# Patient Record
(demographics unavailable — no encounter records)

---

## 2025-01-14 NOTE — ASSESSMENT
[FreeTextEntry1] : Mr. YIMI SPENCE, 68 year old male, legally blind, never smoker, w/ hx of HTN, BPH, prostate cancer s/p prostatectomy and cyber knife in 05/2023. Now with RUL nodule, referred by Dr. Mao Schulz (heme/onc) for CT Sx evaluation.  PET/CT on 10/11/24 ([facility]): - Right upper lobe consolidative opacity without or with low-grade activity (series 2, image 109) measuring 2.9 x 1.6 cm showing SUV max 2.2. - A mildly enlarged left para-aortic node without significant radiotracer activity possibly benign in etiology.  CT Chest w/IV Contrast on 11/08/24 ([facility]): - 2.6 x 1.2 cm nodular opacity right upper lobe, similar in appearance to recent PET-CT 10/11/2024, where it measured 2.7 x 1.5 cm at a similar level. - Adjacent nonspecific linear & nodular opacities.  - Atherosclerosis; 0.3-0.4 cm right middle lobe prevascular nodule medially (series 2, image 128).  - A mildly enlarged left para-aortic node without significant radiotracer activity possibly benign in etiology. - Subcentimeter mediastinal and hilar lymph nodes.  - Subcentimeter axillary lymph nodes.  I have reviewed the patient's medical records and diagnostic images at time of this office consultation and have made the following recommendation: 1. CT reviewed with pt, RUL nodule looks suspicious, will refer pt to IP Dr. Stern for navigational bronchoscopy bx of this nodule.  2. CT Chest with navigational bronchoscopy protocol now  3. PFTs for surgical planning 4. RTC after all above to discuss next step.    I, Dr. KUNZ, JEREMY RODRIGUEZ, personally performed the evaluation and management (E/M) services for this established patient who presents today with (a) new problem(s)/exacerbation of (an) existing condition(s).  That E/M includes conducting the examination, assessing all new/exacerbated conditions, and establishing a new plan of care.  Today, my ACP, Rhoda Adame, [facility] was here to observe my evaluation and management services for this new problem/exacerbated condition to be followed going forward.1

## 2025-01-14 NOTE — HISTORY OF PRESENT ILLNESS
[FreeTextEntry1] : Mr. YIMI SPENCE, 68 year old male, legally blind, never smoker, w/ hx of HTN, BPH, prostate cancer s/p prostatectomy and cyber knife in 05/2023. Now with RUL nodule, referred by Dr. Mao Schulz (heme/onc) for CT Sx evaluation.  PET/CT on 10/11/24 ([facility]): - Right upper lobe consolidative opacity without or with low-grade activity (series 2, image 109) measuring 2.9 x 1.6 cm showing SUV max 2.2. - A mildly enlarged left para-aortic node without significant radiotracer activity possibly benign in etiology.  CT Chest w/IV Contrast on 11/08/24 ([facility]): - 2.6 x 1.2 cm nodular opacity right upper lobe, similar in appearance to recent PET-CT 10/11/2024, where it measured 2.7 x 1.5 cm at a similar level. - Adjacent nonspecific linear & nodular opacities.  - Atherosclerosis; 0.3-0.4 cm right middle lobe prevascular nodule medially (series 2, image 128).  - A mildly enlarged left para-aortic node without significant radiotracer activity possibly benign in etiology. - Subcentimeter mediastinal and hilar lymph nodes.  - Subcentimeter axillary lymph nodes.  Patient is here today for CT Sx consultation.

## 2025-01-14 NOTE — CONSULT LETTER
[Dear  ___] : Dear  [unfilled], [Consult Letter:] : I had the pleasure of evaluating your patient, [unfilled]. [( Thank you for referring [unfilled] for consultation for _____ )] : Thank you for referring [unfilled] for consultation for [unfilled] [Please see my note below.] : Please see my note below. [Consult Closing:] : Thank you very much for allowing me to participate in the care of this patient.  If you have any questions, please do not hesitate to contact me. [Sincerely,] : Sincerely, [FreeTextEntry2] : Dr. Mao Schulz (heme/onc/ref)  [FreeTextEntry3] : Rachelle Vickers MD Attending Surgeon Division of Thoracic Surgery  [employer] at [facility] [place] [phone] [place] [address] Tel: [phone] Fax: [phone]

## 2025-01-14 NOTE — DATA REVIEWED
[FreeTextEntry1] : I have independently reviewed the following: PET/CT on 10/11/24 CT Chest w/IV Contrast on 11/08/24

## 2025-01-14 NOTE — ASSESSMENT
[FreeTextEntry1] : Mr. YIMI SPENCE, 68 year old male, legally blind, never smoker, w/ hx of HTN, BPH, prostate cancer s/p prostatectomy and cyber knife in 05/2023. Now with RUL nodule, referred by Dr. Mao Schulz (heme/onc) for CT Sx evaluation.  PET/CT on 10/11/24 ([facility]): - Right upper lobe consolidative opacity without or with low-grade activity (series 2, image 109) measuring 2.9 x 1.6 cm showing SUV max 2.2. - A mildly enlarged left para-aortic node without significant radiotracer activity possibly benign in etiology.  CT Chest w/IV Contrast on 11/08/24 ([facility]): - 2.6 x 1.2 cm nodular opacity right upper lobe, similar in appearance to recent PET-CT 10/11/2024, where it measured 2.7 x 1.5 cm at a similar level. - Adjacent nonspecific linear & nodular opacities.  - Atherosclerosis; 0.3-0.4 cm right middle lobe prevascular nodule medially (series 2, image 128).  - A mildly enlarged left para-aortic node without significant radiotracer activity possibly benign in etiology. - Subcentimeter mediastinal and hilar lymph nodes.  - Subcentimeter axillary lymph nodes.  I have reviewed the patient's medical records and diagnostic images at time of this office consultation and have made the following recommendation: 1. CT reviewed with pt, RUL nodule looks suspicious, will refer pt to IP Dr. Stern for navigational bronchoscopy bx of this nodule.  2. CT Chest with navigational bronchoscopy protocol now  3. PFTs for surgical planning 4. RTC after all above to discuss next step.    I, Dr. KUNZ, JEREMY RODRIGUEZ, personally performed the evaluation and management (E/M) services for this established patient who presents today with (a) new problem(s)/exacerbation of (an) existing condition(s).  That E/M includes conducting the examination, assessing all new/exacerbated conditions, and establishing a new plan of care.  Today, my ACP, Rhoda Adame, [facility] was here to observe my evaluation and management services for this new problem/exacerbated condition to be followed going forward.1

## 2025-01-14 NOTE — HISTORY OF PRESENT ILLNESS
[FreeTextEntry1] : Mr. YIMI SPENCE, 68 year old male, legally blind, never smoker, w/ hx of HTN, BPH, prostate cancer s/p prostatectomy and cyber knife in 05/2023. Now with RUL nodule, referred by Dr. Mao Schulz (heme/onc) for CT Sx evaluation.  PET/CT on 10/11/24 ([facility]): - Right upper lobe consolidative opacity without or with low-grade activity (series 2, image 109) measuring 2.9 x 1.6 cm showing SUV max 2.2. - A mildly enlarged left para-aortic node without significant radiotracer activity possibly benign in etiology.  CT Chest w/IV Contrast on 11/08/24 ([facility]): - 2.6 x 1.2 cm nodular opacity right upper lobe, similar in appearance to recent PET-CT 10/11/2024, where it measured 2.7 x 1.5 cm at a similar level. - Adjacent nonspecific linear & nodular opacities.  - Atherosclerosis; 0.3-0.4 cm right middle lobe prevascular nodule medially (series 2, image 128).  - A mildly enlarged left para-aortic node without significant radiotracer activity possibly benign in etiology. - Subcentimeter mediastinal and hilar lymph nodes.  - Subcentimeter axillary lymph nodes.  Patient is here today for CT Sx consultation.

## 2025-01-14 NOTE — CONSULT LETTER
[Dear  ___] : Dear  [unfilled], [Consult Letter:] : I had the pleasure of evaluating your patient, [unfilled]. [( Thank you for referring [unfilled] for consultation for _____ )] : Thank you for referring [unfilled] for consultation for [unfilled] [Please see my note below.] : Please see my note below. [Consult Closing:] : Thank you very much for allowing me to participate in the care of this patient.  If you have any questions, please do not hesitate to contact me. [Sincerely,] : Sincerely, [FreeTextEntry2] : Dr. Mao Schulz (heme/onc/ref)  [FreeTextEntry3] : Rachelle Vickers MD Attending Surgeon Division of Thoracic Surgery  [employer] at [facility] [phone] [place] [address] Tel: [phone] Fax: [phone]

## 2025-02-06 NOTE — HISTORY OF PRESENT ILLNESS
[Never] : never [TextBox_4] : Interventional Pulmonology Consultation Note First Visit with IP: Feb 6 2025 9:30AM   Mr. SPENCE is a 68-year-old with PMH of HTN, BPH, prostate cancer (prostatectomy and cyber knife in 05/2023), legally blind. Patient was referred to Interventional Pulmonology by Dr. Vickers for bronchoscopy for RUL nodule.    Patient most recent CT scan of the Chest done on 01/28/2025 at  was notable for the following: -Teardrop shaped posterior right upper lobe nodular opacity measuring 11 x 27 mm (previously 2.6 x 1.2 cm 11/2024 which was done with IV Contrast and not consistent with AV malformation/vascular structure ).     PET/CT on 10/11/24 ([facility]): - Right upper lobe consolidative opacity without or with low-grade activity (series 2, image 109) measuring 2.9 x 1.6 cm showing SUV max 2.2. - A mildly enlarged left para-aortic node without significant radiotracer activity possibly benign in etiology.  CT Chest w/IV Contrast on 11/08/24 ([facility]): - 2.6 x 1.2 cm nodular opacity right upper lobe, similar in appearance to recent PET-CT 10/11/2024, where it measured 2.7 x 1.5 cm at a similar level. - Adjacent nonspecific linear & nodular opacities. - Atherosclerosis; 0.3-0.4 cm right middle lobe prevascular nodule medially (series 2, image 128). - A mildly enlarged left para-aortic node without significant radiotracer activity possibly benign in etiology. - Subcentimeter mediastinal and hilar lymph nodes. - Subcentimeter axillary lymph nodes.   Mr. SPENCE past medical history is notable for:    Today He is [ ]

## 2025-02-06 NOTE — DISCUSSION/SUMMARY
[FreeTextEntry1] : PFT done 01/29/2025 was notable for the following:  FVC: 103% FEV1:117% PEF: 131 % RV: --% TLC: -- % DLCO: -- %

## 2025-02-06 NOTE — ASSESSMENT
[FreeTextEntry1] : Mr. SPENCE is a 68-year-old with PMH of HTN, BPH, prostate cancer (prostatectomy and cyber knife in 05/2023), legally blind. Patient was referred to Interventional Pulmonology due to RUL nodule  11 x 27 mm.  CT changes look to be a nodule however radiology raised the concern that there could be a vascular component raising concern for AV malformation.  Previous documentation suggest that not the case I will ask for CD and PET scan to be sent to me for review prior to but booking procedure  After further review of the provided diagnostic the best procedural option at this time is [facility] Navigation Bronchoscopy. - Orders were implemented IP Booking and CBC, CMP, INR and APTT - We also discuss getting medical clearance from PCP.  Risks of the procedure including but not limited to pneumothorax, bleeding, and hypoxemia were discussed. Potential interventions for these complications were also described extensively. Alternative modalities of diagnostics and therapeutics including surveillance imaging were also discussed. After a thorough risk benefit discussion where all related questions were satisfactorily answered the patient agreed to proceed with the mutually planned intervention.  The patient was advised the importance of follow up and further work up as malignancy is in the differential for this lung nodule. The patient was advised that in the absence of diagnosis and without surveillance imaging and adequate follow up as recommended by current guidelines, there is a risk of progression of malignancy. The importance of surveillance imaging as per the current Fleischner Society/Lung Rads guidelines was discussed. The patient demonstrated understanding of the same with verbal feedback.

## 2025-02-06 NOTE — HISTORY OF PRESENT ILLNESS
[Never] : never [TextBox_4] : Interventional Pulmonology Consultation Note First Visit with IP: Feb 6 2025 9:30AM   Mr. SPENCE is a 68-year-old with PMH of HTN, BPH, prostate cancer (prostatectomy and cyber knife in 05/2023), legally blind. Patient was referred to Interventional Pulmonology by Dr. Vickers for bronchoscopy for RUL nodule.    Patient most recent CT scan of the Chest done on 01/28/2025 at  was notable for the following: -Teardrop shaped posterior right upper lobe nodular opacity measuring 11 x 27 mm (previously 2.6 x 1.2 cm 11/2024 which was done with IV Contrast and not consistent with AV malformation/vascular structure ).     PET/CT on 10/11/24 ([facility]): - Right upper lobe consolidative opacity without or with low-grade activity (series 2, image 109) measuring 2.9 x 1.6 cm showing SUV max 2.2. - A mildly enlarged left para-aortic node without significant radiotracer activity possibly benign in etiology.  CT Chest w/IV Contrast on 11/08/24 ([facility]): - 2.6 x 1.2 cm nodular opacity right upper lobe, similar in appearance to recent PET-CT 10/11/2024, where it measured 2.7 x 1.5 cm at a similar level. - Adjacent nonspecific linear & nodular opacities. - Atherosclerosis; 0.3-0.4 cm right middle lobe prevascular nodule medially (series 2, image 128). - A mildly enlarged left para-aortic node without significant radiotracer activity possibly benign in etiology. - Subcentimeter mediastinal and hilar lymph nodes. - Subcentimeter axillary lymph nodes.   Mr. SPENCE past medical history is notable for:    Today He is [ ]

## 2025-03-10 NOTE — HISTORY OF PRESENT ILLNESS
[FreeTextEntry1] : Mr. YIMI SPENCE, 68 year old male, legally blind, never smoker, w/ hx of HTN, BPH, prostate cancer s/p prostatectomy and cyber knife in 05/2023. Now with RUL nodule, referred by Dr. Mao Schulz (heme/onc) for CT Sx evaluation.  PET/CT on 10/11/24 ([facility]): - Right upper lobe consolidative opacity without or with low-grade activity (series 2, image 109) measuring 2.9 x 1.6 cm showing SUV max 2.2. - A mildly enlarged left para-aortic node without significant radiotracer activity possibly benign in etiology.  CT Chest w/IV Contrast on 11/08/24 ([facility]): - 2.6 x 1.2 cm nodular opacity right upper lobe, similar in appearance to recent PET-CT 10/11/2024, where it measured 2.7 x 1.5 cm at a similar level. - Adjacent nonspecific linear & nodular opacities.  - Atherosclerosis; 0.3-0.4 cm right middle lobe prevascular nodule medially (series 2, image 128).  - A mildly enlarged left para-aortic node without significant radiotracer activity possibly benign in etiology. - Subcentimeter mediastinal and hilar lymph nodes.  - Subcentimeter axillary lymph nodes.  1/14/2025: CT reviewed with pt, RUL nodule looks suspicious, will refer pt to IP Dr. Stern for navigational bronchoscopy bx of this nodule. CT Chest with navigational bronchoscopy protocol now. PFTs for surgical planning. RTC after all above to discuss next step.  PFTs on 1/29/2025: FVC 3.55, 103%; FEV1 3.14, 117%; unable to complete phethysmography or diffusing capacity d/t difficulty with both inspiratory and expiratory maneuvers.   CT Chest non contrast on 1/28/2025: - Teardrop shaped posterior right upper lobe nodular opacity measuring 11 x 27 mm, possibly communicating with vessels medially and posteriorly. - Perifissural micronodule in the left midlung (5:140)  now s/p Navigational Bronch, EBUS, RUL biopsy on 2/28/2025 with Dr. Stern: - Path LYMPH NODE, 11RS, EBUS-GUIDED FNA: non diagnostic. The cytology slides and cell block section are composed of rare bronchial cells and an insufficient population of lymphocytes, blood and rare bronchial cells. - Path LUNG, RIGHT UPPER LOBE, BRONCHOALVEOLAR LAVAGE: negative for malignant cells. The cytology slide and cell block section are composed of bronchial cells, blood and macrophages.  CT Chest on 3/10/2025: -  Patient presents today for follow up.

## 2025-03-10 NOTE — CONSULT LETTER
[Dear  ___] : Dear  [unfilled], [Consult Letter:] : I had the pleasure of evaluating your patient, [unfilled]. [( Thank you for referring [unfilled] for consultation for _____ )] : Thank you for referring [unfilled] for consultation for [unfilled] [Please see my note below.] : Please see my note below. [Consult Closing:] : Thank you very much for allowing me to participate in the care of this patient.  If you have any questions, please do not hesitate to contact me. [Sincerely,] : Sincerely, [FreeTextEntry2] : Dr. Mao Schulz (heme/onc/ref)  [FreeTextEntry3] : Rachelle Vickers MD Attending Surgeon Division of Thoracic Surgery  [employer] at [facility] [phone] [place] [address] Tel: [phone] Fax: [phone]

## 2025-03-10 NOTE — DATA REVIEWED
[FreeTextEntry1] : I have independently reviewed the following: CT Chest on 1/28/2025 Pathology from 2/28/2025 CT Chest on 3/10/2025

## 2025-03-18 NOTE — HISTORY OF PRESENT ILLNESS
[FreeTextEntry1] : Mr. YIMI SPENCE, 68 year old male, legally blind, never smoker, w/ hx of HTN, BPH, prostate cancer s/p prostatectomy and cyber knife in 05/2023. Now with RUL nodule, referred by Dr. Mao Schulz (heme/onc) for CT Sx evaluation.  PET/CT on 10/11/24 ([facility]): - Right upper lobe consolidative opacity without or with low-grade activity (series 2, image 109) measuring 2.9 x 1.6 cm showing SUV max 2.2. - A mildly enlarged left para-aortic node without significant radiotracer activity possibly benign in etiology.  CT Chest w/IV Contrast on 11/08/24 ([facility]): - 2.6 x 1.2 cm nodular opacity right upper lobe, similar in appearance to recent PET-CT 10/11/2024, where it measured 2.7 x 1.5 cm at a similar level. - Adjacent nonspecific linear & nodular opacities.  - Atherosclerosis; 0.3-0.4 cm right middle lobe prevascular nodule medially (series 2, image 128).  - A mildly enlarged left para-aortic node without significant radiotracer activity possibly benign in etiology. - Subcentimeter mediastinal and hilar lymph nodes.  - Subcentimeter axillary lymph nodes.  1/14/2025: CT reviewed with pt, RUL nodule looks suspicious, will refer pt to IP Dr. Stern for navigational bronchoscopy bx of this nodule. CT Chest with navigational bronchoscopy protocol now. PFTs for surgical planning. RTC after all above to discuss next step.  PFTs on 1/29/2025: FVC 3.55, 103%; FEV1 3.14, 117%; unable to complete phethysmography or diffusing capacity d/t difficulty with both inspiratory and expiratory maneuvers.   CT Chest non contrast on 1/28/2025: - Teardrop shaped posterior right upper lobe nodular opacity measuring 11 x 27 mm, possibly communicating with vessels medially and posteriorly. - Perifissural micronodule in the left midlung (5:140)  Now s/p Navigational Bronch, EBUS, RUL biopsy on 2/28/2025 with Dr. Stern: - Path LYMPH NODE, 11RS, EBUS-GUIDED FNA: non diagnostic. The cytology slides and cell block section are composed of rare bronchial cells and an insufficient population of lymphocytes, blood and rare bronchial cells. - Path LUNG, RIGHT UPPER LOBE, BRONCHOALVEOLAR LAVAGE: negative for malignant cells. The cytology slide and cell block section are composed of bronchial cells, blood and macrophages.  CT Chest on 3/10/2025: - stable right upper lobe posterior segment nonenhancing tubular and branching lesion surrounded by parenchymal hyperlucency (5, 48) 2.9 x 1.5 cm  Patient presents today for follow up. Pt reports doing well, denies SOB, cough or CP.

## 2025-03-18 NOTE — ASSESSMENT
[FreeTextEntry1] : Mr. YIMI SPENCE, 68 year old male, legally blind, never smoker, w/ hx of HTN, BPH, prostate cancer s/p prostatectomy and cyber knife in 05/2023. Now with RUL nodule, referred by Dr. Mao Schulz (heme/onc) for CT Sx evaluation.  Now s/p Navigational Bronch, EBUS, RUL biopsy on 2/28/2025 with Dr. Stern: - Path LYMPH NODE, 11RS, EBUS-GUIDED FNA: non diagnostic. The cytology slides and cell block section are composed of rare bronchial cells and an insufficient population of lymphocytes, blood and rare bronchial cells. - Path LUNG, RIGHT UPPER LOBE, BRONCHOALVEOLAR LAVAGE: negative for malignant cells. The cytology slide and cell block section are composed of bronchial cells, blood and macrophages.  CT Chest on 3/10/2025: - stable right upper lobe posterior segment nonenhancing tubular and branching lesion surrounded by parenchymal hyperlucency (5, 48) 2.9 x 1.5 cm  I have reviewed the patient's medical records and diagnostic images at time of this office consultation and have made the following recommendation: 1. 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 Path also revealed benign findings. Therefore, RTC in 6 mons with CT Chest w/o contrast   [facility] personally performed the evaluation and management (E/M) services for this established patient who presents today with (a) new problem(s)/exacerbation of (an) existing condition(s).  That E/M includes conducting the examination, assessing all new/exacerbated conditions, and establishing a new plan of care.  Today, my ACP, Rhoda Adame, [facility] was here to observe my evaluation and management services for this new problem/exacerbated condition to be followed going forward.

## 2025-03-18 NOTE — HISTORY OF PRESENT ILLNESS
[FreeTextEntry1] : Mr. YIMI SPENCE, 68 year old male, legally blind, never smoker, w/ hx of HTN, BPH, prostate cancer s/p prostatectomy and cyber knife in 05/2023. Now with RUL nodule, referred by Dr. Mao Schulz (heme/onc) for CT Sx evaluation.  PET/CT on 10/11/24 ([facility]): - Right upper lobe consolidative opacity without or with low-grade activity (series 2, image 109) measuring 2.9 x 1.6 cm showing SUV max 2.2. - A mildly enlarged left para-aortic node without significant radiotracer activity possibly benign in etiology.  CT Chest w/IV Contrast on 11/08/24 ([facility]): - 2.6 x 1.2 cm nodular opacity right upper lobe, similar in appearance to recent PET-CT 10/11/2024, where it measured 2.7 x 1.5 cm at a similar level. - Adjacent nonspecific linear & nodular opacities.  - Atherosclerosis; 0.3-0.4 cm right middle lobe prevascular nodule medially (series 2, image 128).  - A mildly enlarged left para-aortic node without significant radiotracer activity possibly benign in etiology. - Subcentimeter mediastinal and hilar lymph nodes.  - Subcentimeter axillary lymph nodes.  1/14/2025: CT reviewed with pt, RUL nodule looks suspicious, will refer pt to IP Dr. Stern for navigational bronchoscopy bx of this nodule. CT Chest with navigational bronchoscopy protocol now. PFTs for surgical planning. RTC after all above to discuss next step.  PFTs on 1/29/2025: FVC 3.55, 103%; FEV1 3.14, 117%; unable to complete phethysmography or diffusing capacity d/t difficulty with both inspiratory and expiratory maneuvers.   CT Chest non contrast on 1/28/2025: - Teardrop shaped posterior right upper lobe nodular opacity measuring 11 x 27 mm, possibly communicating with vessels medially and posteriorly. - Perifissural micronodule in the left midlung (5:140)  Now s/p Navigational Bronch, EBUS, RUL biopsy on 2/28/2025 with Dr. Stern: - Path LYMPH NODE, 11RS, EBUS-GUIDED FNA: non diagnostic. The cytology slides and cell block section are composed of rare bronchial cells and an insufficient population of lymphocytes, blood and rare bronchial cells. - Path LUNG, RIGHT UPPER LOBE, BRONCHOALVEOLAR LAVAGE: negative for malignant cells. The cytology slide and cell block section are composed of bronchial cells, blood and macrophages.  CT Chest on 3/10/2025: - stable right upper lobe posterior segment nonenhancing tubular and branching lesion surrounded by parenchymal hyperlucency (5, 48) 2.9 x 1.5 cm  Patient presents today for follow up. Pt reports doing well, denies SOB, cough or CP.

## 2025-03-18 NOTE — CONSULT LETTER
[FreeTextEntry2] : Dr. Mao Schulz (heme/onc/ref)  [FreeTextEntry3] : Rachelle Vickers MD Attending Surgeon Division of Thoracic Surgery  [employer] at [facility] [phone] [place] [address] Tel: [phone] Fax: [phone]

## 2025-03-18 NOTE — CONSULT LETTER
[FreeTextEntry2] : Dr. Mao Schulz (heme/onc/ref)  [FreeTextEntry3] : Rachelle Vickers MD Attending Surgeon Division of Thoracic Surgery  [employer] at [facility] [phone] [place] [address] Tel: [phone] Fax: [phone]

## 2025-03-18 NOTE — ASSESSMENT
[FreeTextEntry1] : Mr. YIMI SPENCE, 68 year old male, legally blind, never smoker, w/ hx of HTN, BPH, prostate cancer s/p prostatectomy and cyber knife in 05/2023. Now with RUL nodule, referred by Dr. Mao Schulz (heme/onc) for CT Sx evaluation.  Now s/p Navigational Bronch, EBUS, RUL biopsy on 2/28/2025 with Dr. Stern: - Path LYMPH NODE, 11RS, EBUS-GUIDED FNA: non diagnostic. The cytology slides and cell block section are composed of rare bronchial cells and an insufficient population of lymphocytes, blood and rare bronchial cells. - Path LUNG, RIGHT UPPER LOBE, BRONCHOALVEOLAR LAVAGE: negative for malignant cells. The cytology slide and cell block section are composed of bronchial cells, blood and macrophages.  CT Chest on 3/10/2025: - stable right upper lobe posterior segment nonenhancing tubular and branching lesion surrounded by parenchymal hyperlucency (5, 48) 2.9 x 1.5 cm  I have reviewed the patient's medical records and diagnostic images at time of this office consultation and have made the following recommendation: 1. 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 Path also revealed benign findings. Therefore, RTC in 6 mons with CT Chest w/o contrast   [facility]UNZ[facility] performed the evaluation and management (E/M) services for this established patient who presents today with (a) new problem(s)/exacerbation of (an) existing condition(s).  That E/M includes conducting the examination, assessing all new/exacerbated conditions, and establishing a new plan of care.  Today, my ACP, Rhoda Adame, PRATIK was here to observe my evaluation and management services for this new problem/exacerbated condition to be followed going forward.

## 2025-03-23 NOTE — HISTORY OF PRESENT ILLNESS
[TextBox_4] : Interventional Pulmonology Consultation Note First Visit with IP: Feb 6 2025 9:30AM   Mr. SPENCE is a 68-year-old with PMH of HTN, BPH, prostate cancer (prostatectomy and cyber knife in 05/2023), legally blind. Patient was referred to Interventional Pulmonology by Dr. Vickers for bronchoscopy for RUL nodule.    Patient most recent CT scan of the Chest done on 01/28/2025 at  was notable for the following: -Teardrop shaped posterior right upper lobe nodular opacity measuring 11 x 27 mm (previously 2.6 x 1.2 cm 11/2024 which was done with IV Contrast and not consistent with AV malformation/vascular structure ).     PET/CT on 10/11/24 ([facility]): - Right upper lobe consolidative opacity without or with low-grade activity (series 2, image 109) measuring 2.9 x 1.6 cm showing SUV max 2.2. - A mildly enlarged left para-aortic node without significant radiotracer activity possibly benign in etiology.  CT Chest w/IV Contrast on 11/08/24 ([facility]): - 2.6 x 1.2 cm nodular opacity right upper lobe, similar in appearance to recent PET-CT 10/11/2024, where it measured 2.7 x 1.5 cm at a similar level. - Adjacent nonspecific linear & nodular opacities. - Atherosclerosis; 0.3-0.4 cm right middle lobe prevascular nodule medially (series 2, image 128). - A mildly enlarged left para-aortic node without significant radiotracer activity possibly benign in etiology. - Subcentimeter mediastinal and hilar lymph nodes. - Subcentimeter axillary lymph nodes.    Today He is doing well without any fevers and chills I have reviewed the CT imaging with him today

## 2025-03-23 NOTE — ASSESSMENT
[FreeTextEntry1] : Mr. SPENCE is a 68-year-old with PMH of HTN, BPH, prostate cancer (prostatectomy and cyber knife in 05/2023), legally blind. Patient was referred to Interventional Pulmonology due to RUL nodule  11 x 27 mm.  Initial robotic attempt with radial EBUS findings concerning for increased vasculature in the area of biopsy.  Given previous concern for AV malformation only completed EBUS component of lymph nodes.  These have returned negative for malignancy.  Repeat CT chest with and without IV contrast had formal read concerning for pneumocele.  However no comment for AV malformation.  I have reached out to the radiology team to discuss further and have discussed his ongoing care with thoracic surgery.  I will call him after these discussions are done to solidify follow-up plan.  Should this to be pneumocele we can continue to follow his is not having ongoing infection.  Should this be AV malformation will discuss with thoracic for further steps.  Otherwise we will plan for repeat biopsy.  Jamey Stern MD Interventional Pulmonology Pulmonary and Critical Care Medicine [facility] Office: [phone]

## 2025-03-23 NOTE — ASSESSMENT
[FreeTextEntry1] : Mr. SPENCE is a 68-year-old with PMH of HTN, BPH, prostate cancer (prostatectomy and cyber knife in 05/2023), legally blind. Patient was referred to Interventional Pulmonology due to RUL nodule  11 x 27 mm.  Initial robotic attempt with radial EBUS findings concerning for increased vasculature in the area of biopsy.  Given previous concern for AV malformation only completed EBUS component of lymph nodes.  These have returned negative for malignancy.  Repeat CT chest with and without IV contrast had formal read concerning for pneumocele.  However no comment for AV malformation.  I have reached out to the radiology team to discuss further and have discussed his ongoing care with thoracic surgery.  I will call him after these discussions are done to solidify follow-up plan.  Should this to be pneumocele we can continue to follow his is not having ongoing infection.  Should this be AV malformation will discuss with thoracic for further steps.  Otherwise we will plan for repeat biopsy.  Jamey Stern MD Interventional Pulmonology Pulmonary and Critical Care Medicine [facility] Office: [phone]

## 2025-03-23 NOTE — ASSESSMENT
[FreeTextEntry1] : Mr. SPENCE is a 68-year-old with PMH of HTN, BPH, prostate cancer (prostatectomy and cyber knife in 05/2023), legally blind. Patient was referred to Interventional Pulmonology due to RUL nodule  11 x 27 mm.  Initial robotic attempt with radial EBUS findings concerning for increased vasculature in the area of biopsy.  Given previous concern for AV malformation only completed EBUS component of lymph nodes.  These have returned negative for malignancy.  Repeat CT chest with and without IV contrast had formal read concerning for pneumocele.  However no comment for AV malformation.  I have reached out to the radiology team to discuss further and have discussed his ongoing care with thoracic surgery.  I will call him after these discussions are done to solidify follow-up plan.  Should this to be pneumocele we can continue to follow his is not having ongoing infection.  Should this be AV malformation will discuss with thoracic for further steps.  Otherwise we will plan for repeat biopsy.  Jamey Stern MD Interventional Pulmonology Pulmonary and Critical Care Medicine [facility] Office: [phone]

## 2025-03-23 NOTE — HISTORY OF PRESENT ILLNESS
[TextBox_4] : Interventional Pulmonology Consultation Note First Visit with IP: Feb 6 2025 9:30AM   Mr. SPENCE is a 68-year-old with PMH of HTN, BPH, prostate cancer (prostatectomy and cyber knife in 05/2023), legally blind. Patient was referred to Interventional Pulmonology by Dr. Vickers for bronchoscopy for RUL nodule.    Patient most recent CT scan of the Chest done on 01/28/2025 at  was notable for the following: -Teardrop shaped posterior right upper lobe nodular opacity measuring 11 x 27 mm (previously 2.6 x 1.2 cm 11/2024 which was done with IV Contrast and not consistent with AV malformation/vascular structure ).     PET/CT on 10/11/24 ([facility]): - Right upper lobe consolidative opacity without or with low-grade activity (series 2, image 109) measuring 2.9 x 1.6 cm showing SUV max 2.2. - A mildly enlarged left para-aortic node without significant radiotracer activity possibly benign in etiology.  CT Chest w/IV Contrast on 11/08/24 ([facility]): - 2.6 x 1.2 cm nodular opacity right upper lobe, similar in appearance to recent PET-CT 10/11/2024, where it measured 2.7 x 1.5 cm at a similar level. - Adjacent nonspecific linear & nodular opacities. - Atherosclerosis; 0.3-0.4 cm right middle lobe prevascular nodule medially (series 2, image 128). - A mildly enlarged left para-aortic node without significant radiotracer activity possibly benign in etiology. - Subcentimeter mediastinal and hilar lymph nodes. - Subcentimeter axillary lymph nodes.    Today He is doing well without any fevers and chills I have reviewed the CT imaging with him today

## 2025-03-23 NOTE — PHYSICAL EXAM
- Patient with depressed LV function and associated severe restriction with diastolic failure and pulmonary hypertension, moderate MR/TR.  - Severe hypoxemia currently requiring high flow oxygen.  - On multiple diuretics as outpatient - was on IV diuretics for several days, now discontinued  - Discussed code status in setting of heart failure with worsening CKD - patient does not desire aggressive resuscitation measures and wants to be made comfortable.  - Plan for home hospice.   [TextBox_132] : Blind

## 2025-03-23 NOTE — HISTORY OF PRESENT ILLNESS
[TextBox_4] : Interventional Pulmonology Consultation Note First Visit with IP: Feb 6 2025 9:30AM   Mr. SPENCE is a 68-year-old with PMH of HTN, BPH, prostate cancer (prostatectomy and cyber knife in 05/2023), legally blind. Patient was referred to Interventional Pulmonology by Dr. Vickers for bronchoscopy for RUL nodule.    Patient most recent CT scan of the Chest done on 01/28/2025 at  was notable for the following: -Teardrop shaped posterior right upper lobe nodular opacity measuring 11 x 27 mm (previously 2.6 x 1.2 cm 11/2024 which was done with IV Contrast and not consistent with AV malformation/vascular structure ).     PET/CT on 10/11/24 ([facility]): - Right upper lobe consolidative opacity without or with low-grade activity (series 2, image 109) measuring 2.9 x 1.6 cm showing SUV max 2.2. - A mildly enlarged left para-aortic node without significant radiotracer activity possibly benign in etiology.  CT Chest w/IV Contrast on 11/08/24 ([facility]): - 2.6 x 1.2 cm nodular opacity right upper lobe, similar in appearance to recent PET-CT 10/11/2024, where it measured 2.7 x 1.5 cm at a similar level. - Adjacent nonspecific linear & nodular opacities. - Atherosclerosis; 0.3-0.4 cm right middle lobe prevascular nodule medially (series 2, image 128). - A mildly enlarged left para-aortic node without significant radiotracer activity possibly benign in etiology. - Subcentimeter mediastinal and hilar lymph nodes. - Subcentimeter axillary lymph nodes.    Today He is doing well without any fevers and chills I have reviewed the CT imaging with him today

## 2025-03-23 NOTE — DISCUSSION/SUMMARY
[FreeTextEntry1] : PFT done 01/29/2025 was notable for the following:  FVC: 103% FEV1:117% PEF: 131 % RV: --% TLC: -- % DLCO: -- % 13-Mar-2021 20:33:43